# Patient Record
Sex: FEMALE | Race: WHITE | Employment: FULL TIME | URBAN - METROPOLITAN AREA
[De-identification: names, ages, dates, MRNs, and addresses within clinical notes are randomized per-mention and may not be internally consistent; named-entity substitution may affect disease eponyms.]

---

## 2019-01-31 PROBLEM — Z34.01 PRIMIGRAVIDA IN FIRST TRIMESTER: Status: ACTIVE | Noted: 2019-01-31

## 2019-03-21 PROBLEM — Z34.00 PRIMIGRAVIDA, ANTEPARTUM: Status: ACTIVE | Noted: 2019-01-31

## 2019-03-21 PROBLEM — Z34.01 PRIMIGRAVIDA IN FIRST TRIMESTER: Status: RESOLVED | Noted: 2019-01-31 | Resolved: 2019-03-21

## 2019-03-26 PROBLEM — Z36.3 ANTENATAL SCREENING FOR MALFORMATION USING ULTRASONICS: Status: ACTIVE | Noted: 2019-03-26

## 2019-03-26 PROBLEM — O32.2XX0 TRANSVERSE LIE OF FETUS: Status: ACTIVE | Noted: 2019-03-26

## 2019-04-29 ENCOUNTER — HOSPITAL ENCOUNTER (OUTPATIENT)
Age: 24
Discharge: HOME | End: 2019-04-29
Attending: OBSTETRICS & GYNECOLOGY | Admitting: OBSTETRICS & GYNECOLOGY
Payer: COMMERCIAL

## 2019-04-29 VITALS
OXYGEN SATURATION: 99 % | DIASTOLIC BLOOD PRESSURE: 58 MMHG | SYSTOLIC BLOOD PRESSURE: 117 MMHG | RESPIRATION RATE: 18 BRPM | BODY MASS INDEX: 32.02 KG/M2 | TEMPERATURE: 98.3 F | HEART RATE: 85 BPM | HEIGHT: 62 IN | WEIGHT: 174 LBS

## 2019-04-29 PROBLEM — O36.8130 DECREASED FETAL MOVEMENT AFFECTING MANAGEMENT OF PREGNANCY IN THIRD TRIMESTER: Status: ACTIVE | Noted: 2019-04-29

## 2019-04-29 PROCEDURE — 75810000275 HC EMERGENCY DEPT VISIT NO LEVEL OF CARE: Performed by: EMERGENCY MEDICINE

## 2019-04-29 NOTE — DISCHARGE INSTRUCTIONS
Patient Education   Patient Education        Learning About When to Call Your Doctor During Pregnancy (After 20 Weeks)  Your Care Instructions  It's common to have concerns about what might be a problem during pregnancy. Although most pregnant women don't have any serious problems, it's important to know when to call your doctor if you have certain symptoms or signs of labor. These are general suggestions. Your doctor may give you some more information about when to call. When to call your doctor (after 20 weeks)  Call 911 anytime you think you may need emergency care. For example, call if:  · You have severe vaginal bleeding. · You have sudden, severe pain in your belly. · You passed out (lost consciousness). · You have a seizure. · You see or feel the umbilical cord. · You think you are about to deliver your baby and can't make it safely to the hospital.  Call your doctor now or seek immediate medical care if:  · You have vaginal bleeding. · You have belly pain. · You have a fever. · You have symptoms of preeclampsia, such as:  ? Sudden swelling of your face, hands, or feet. ? New vision problems (such as dimness or blurring). ? A severe headache. · You have a sudden release of fluid from your vagina. (You think your water broke.)  · You think that you may be in labor. This means that you've had at least 4 contractions within 20 minutes or at least 8 contractions in an hour. · You notice that your baby has stopped moving or is moving much less than normal.  · You have symptoms of a urinary tract infection. These may include:  ? Pain or burning when you urinate. ? A frequent need to urinate without being able to pass much urine. ? Pain in the flank, which is just below the rib cage and above the waist on either side of the back. ? Blood in your urine. Watch closely for changes in your health, and be sure to contact your doctor if:  · You have vaginal discharge that smells bad.   · You have skin changes, such as:  ? A rash. ? Itching. ? Yellow color to your skin. · You have other concerns about your pregnancy. If you have labor signs at 37 weeks or more  If you have signs of labor at 37 weeks or more, your doctor may tell you to call when your labor becomes more active. Symptoms of active labor include:  · Contractions that are regular. · Contractions that are less than 5 minutes apart. · Contractions that are hard to talk through. Follow-up care is a key part of your treatment and safety. Be sure to make and go to all appointments, and call your doctor if you are having problems. It's also a good idea to know your test results and keep a list of the medicines you take. Where can you learn more? Go to http://dennis-alison.info/. Enter  in the search box to learn more about \"Learning About When to Call Your Doctor During Pregnancy (After 20 Weeks). \"  Current as of: September 5, 2018  Content Version: 11.9  © 9029-1605 [employer]. Care instructions adapted under license by [employer] (which disclaims liability or warranty for this information). If you have questions about a medical condition or this instruction, always ask your healthcare professional. [place] [address] any warranty or liability for your use of this information. Counting Your Baby's Kicks: Care Instructions  Your Care Instructions    Counting your baby's kicks is one way your doctor can tell that your baby is healthy. Most women--especially in a first pregnancy--feel their baby move for the first time between 16 and 22 weeks. The movement may feel like flutters rather than kicks. Your baby may move more at certain times of the day. When you are active, you may notice less kicking than when you are resting. At your prenatal visits, your doctor will ask whether the baby is active.   In your last trimester, your doctor may ask you to count the number of times you feel your baby move. Follow-up care is a key part of your treatment and safety. Be sure to make and go to all appointments, and call your doctor if you are having problems. It's also a good idea to know your test results and keep a list of the medicines you take. How do you count fetal kicks? · A common method of checking your baby's movement is to count the number of kicks or moves you feel in 1 hour. Ten movements (such as kicks, flutters, or rolls) in 1 hour are normal. Some doctors suggest that you count in the morning until you get to 10 movements. Then you can quit for that day and start again the next day. · Pick your baby's most active time of day to count. This may be any time from morning to evening. · If you do not feel 10 movements in an hour, your baby may be sleeping. Wait for the next hour and count again. When should you call for help? Call your doctor now or seek immediate medical care if:    · You noticed that your baby has stopped moving or is moving much less than normal.    Watch closely for changes in your health, and be sure to contact your doctor if you have any problems. Where can you learn more? Go to http://dennis-alison.info/. Enter T229 in the search box to learn more about \"Counting Your Baby's Kicks: Care Instructions. \"  Current as of: September 5, 2018  Content Version: 11.9  © 8029-1892 [employer]. Care instructions adapted under license by [employer] (which disclaims liability or warranty for this information). If you have questions about a medical condition or this instruction, always ask your healthcare professional. [address] any warranty or liability for your use of this information.

## 2019-04-29 NOTE — ED PROVIDER NOTES
Chief Complaint:      26 y.o. female at 24w3d  weeks gestation who is seen for decreased fetal movement. Has not felt movement for 2 days, previously felt regular movement. Pregnancy uncomplicated thus far. HISTORY:    Social History     Substance and Sexual Activity   Sexual Activity Not on file     Patient's last menstrual period was 11/06/2018 (approximate). Social History     Socioeconomic History    Marital status:      Spouse name: Not on file    Number of children: Not on file    Years of education: Not on file    Highest education level: Not on file   Occupational History    Not on file   Social Needs    Financial resource strain: Not on file    Food insecurity:     Worry: Not on file     Inability: Not on file    Transportation needs:     Medical: Not on file     Non-medical: Not on file   Tobacco Use    Smoking status: Never Smoker    Smokeless tobacco: Never Used   Substance and Sexual Activity    Alcohol use: No     Frequency: Never    Drug use: No    Sexual activity: Not on file   Lifestyle    Physical activity:     Days per week: Not on file     Minutes per session: Not on file    Stress: Not on file   Relationships    Social connections:     Talks on phone: Not on file     Gets together: Not on file     Attends Orthodoxy service: Not on file     Active member of club or organization: Not on file     Attends meetings of clubs or organizations: Not on file     Relationship status: Not on file    Intimate partner violence:     Fear of current or ex partner: Not on file     Emotionally abused: Not on file     Physically abused: Not on file     Forced sexual activity: Not on file   Other Topics Concern    Not on file   Social History Narrative    Not on file       No past surgical history on file.     Past Medical History:   Diagnosis Date    Primigravida in first trimester 1/31/2019         ROS:  A 12 point review of symptoms negative except for chief complaint as described above. PHYSICAL EXAM:  Blood pressure (!) 165/92, pulse (!) 108, temperature 98.3 °F (36.8 °C), resp. rate 18, height 5' 2\" (1.575 m), weight 78.9 kg (174 lb), last menstrual period 11/06/2018, SpO2 99 %. Constitutional: The patient appears well, alert, oriented x 3. Cardiovascular: Heart RRR, no murmurs. Respiratory: Lungs clear, no respiratory distress  GI: Abdomen soft, nontender, no guarding  No fundal tenderness  Musculoskeletal: no cva tenderness  Upper ext: no edema, reflexes +2  Lower ext: no edema, neg bree's, reflexes +2  Skin: no rashes or lesions  Psychiatric:Mood/ Affect: appropriate  Genitourinary: SVE: deferred  FHT:140-150, moderate variability, reactive NST for 24 wks  TOCO: no contractions    I personally reviewed pt's medical record including relevant labs and ultrasounds and fetal monitor strip. Assessment/Plan: Decreased fetal movement with reassuring fetal status, reactive NST. Discussed movement counts, stable for discharge home, follow up in office as scheduled.

## 2019-06-20 PROBLEM — Z34.03 PRIMIGRAVIDA IN THIRD TRIMESTER: Status: ACTIVE | Noted: 2019-01-31

## 2019-06-20 PROBLEM — R79.89 ABNORMAL SERUM THYROID STIMULATING HORMONE (TSH) LEVEL: Status: ACTIVE | Noted: 2019-06-20

## 2019-07-18 PROBLEM — O26.843 UTERINE SIZE DATE DISCREPANCY PREGNANCY, THIRD TRIMESTER: Status: ACTIVE | Noted: 2019-07-18

## 2019-07-28 PROBLEM — O36.63X0 FETAL MACROSOMIA DURING PREGNANCY IN THIRD TRIMESTER: Status: ACTIVE | Noted: 2019-07-28

## 2019-08-07 ENCOUNTER — HOSPITAL ENCOUNTER (INPATIENT)
Age: 24
LOS: 4 days | Discharge: HOME | End: 2019-08-11
Attending: OBSTETRICS & GYNECOLOGY | Admitting: OBSTETRICS & GYNECOLOGY
Payer: COMMERCIAL

## 2019-08-07 DIAGNOSIS — Z34.90 ENCOUNTER FOR PLANNED INDUCTION OF LABOR: Primary | ICD-10-CM

## 2019-08-07 PROBLEM — Z3A.38 38 WEEKS GESTATION OF PREGNANCY: Status: ACTIVE | Noted: 2019-08-07

## 2019-08-07 PROBLEM — O14.93 PRE-ECLAMPSIA IN THIRD TRIMESTER: Status: ACTIVE | Noted: 2019-08-07

## 2019-08-07 PROBLEM — O14.93 PREECLAMPSIA, THIRD TRIMESTER: Status: ACTIVE | Noted: 2019-08-07

## 2019-08-07 LAB
ABO + RH BLD: NORMAL
BLOOD GROUP ANTIBODIES SERPL: NORMAL
ERYTHROCYTE [DISTWIDTH] IN BLOOD BY AUTOMATED COUNT: 12.5 % (ref 11.9–14.6)
HCT VFR BLD AUTO: 34.4 % (ref 35.8–46.3)
HGB BLD-MCNC: 11.8 G/DL (ref 11.7–15.4)
MCH RBC QN AUTO: 29.6 PG (ref 26.1–32.9)
MCHC RBC AUTO-ENTMCNC: 34.3 G/DL (ref 31.4–35)
MCV RBC AUTO: 86.4 FL (ref 79.6–97.8)
NRBC # BLD: 0 K/UL (ref 0–0.2)
PLATELET # BLD AUTO: 160 K/UL (ref 150–450)
PMV BLD AUTO: 12.2 FL (ref 9.4–12.3)
RBC # BLD AUTO: 3.98 M/UL (ref 4.05–5.2)
SPECIMEN EXP DATE BLD: NORMAL
WBC # BLD AUTO: 11.4 K/UL (ref 4.3–11.1)

## 2019-08-07 PROCEDURE — 74011250636 HC RX REV CODE- 250/636: Performed by: OBSTETRICS & GYNECOLOGY

## 2019-08-07 PROCEDURE — 86900 BLOOD TYPING SEROLOGIC ABO: CPT

## 2019-08-07 PROCEDURE — 65270000029 HC RM PRIVATE

## 2019-08-07 PROCEDURE — 4A1H74Z MONITORING OF PRODUCTS OF CONCEPTION, CARDIAC ELECTRICAL ACTIVITY, VIA NATURAL OR ARTIFICIAL OPENING: ICD-10-PCS | Performed by: OBSTETRICS & GYNECOLOGY

## 2019-08-07 PROCEDURE — 85027 COMPLETE CBC AUTOMATED: CPT

## 2019-08-07 PROCEDURE — 74011250637 HC RX REV CODE- 250/637: Performed by: OBSTETRICS & GYNECOLOGY

## 2019-08-07 RX ORDER — BUTORPHANOL TARTRATE 2 MG/ML
1 INJECTION INTRAMUSCULAR; INTRAVENOUS
Status: DISCONTINUED | OUTPATIENT
Start: 2019-08-07 | End: 2019-08-09 | Stop reason: HOSPADM

## 2019-08-07 RX ORDER — OXYTOCIN/RINGER'S LACTATE 30/500 ML
0-25 PLASTIC BAG, INJECTION (ML) INTRAVENOUS
Status: DISCONTINUED | OUTPATIENT
Start: 2019-08-07 | End: 2019-08-09 | Stop reason: HOSPADM

## 2019-08-07 RX ORDER — DEXTROSE, SODIUM CHLORIDE, SODIUM LACTATE, POTASSIUM CHLORIDE, AND CALCIUM CHLORIDE 5; .6; .31; .03; .02 G/100ML; G/100ML; G/100ML; G/100ML; G/100ML
125 INJECTION, SOLUTION INTRAVENOUS CONTINUOUS
Status: DISCONTINUED | OUTPATIENT
Start: 2019-08-07 | End: 2019-08-11 | Stop reason: HOSPADM

## 2019-08-07 RX ORDER — SODIUM CHLORIDE 0.9 % (FLUSH) 0.9 %
5-40 SYRINGE (ML) INJECTION EVERY 8 HOURS
Status: DISCONTINUED | OUTPATIENT
Start: 2019-08-07 | End: 2019-08-11 | Stop reason: HOSPADM

## 2019-08-07 RX ORDER — MINERAL OIL
120 OIL (ML) ORAL
Status: ACTIVE | OUTPATIENT
Start: 2019-08-07 | End: 2019-08-08

## 2019-08-07 RX ORDER — OXYTOCIN/RINGER'S LACTATE 15/250 ML
250 PLASTIC BAG, INJECTION (ML) INTRAVENOUS ONCE
Status: ACTIVE | OUTPATIENT
Start: 2019-08-07 | End: 2019-08-08

## 2019-08-07 RX ORDER — SODIUM CHLORIDE 0.9 % (FLUSH) 0.9 %
5-40 SYRINGE (ML) INJECTION AS NEEDED
Status: DISCONTINUED | OUTPATIENT
Start: 2019-08-07 | End: 2019-08-11 | Stop reason: HOSPADM

## 2019-08-07 RX ORDER — LIDOCAINE HYDROCHLORIDE 20 MG/ML
JELLY TOPICAL
Status: ACTIVE | OUTPATIENT
Start: 2019-08-07 | End: 2019-08-08

## 2019-08-07 RX ORDER — LIDOCAINE HYDROCHLORIDE 10 MG/ML
1 INJECTION INFILTRATION; PERINEURAL
Status: ACTIVE | OUTPATIENT
Start: 2019-08-07 | End: 2019-08-08

## 2019-08-07 RX ADMIN — MISOPROSTOL 50 MCG: 100 TABLET ORAL at 19:30

## 2019-08-07 RX ADMIN — SODIUM CHLORIDE, SODIUM LACTATE, POTASSIUM CHLORIDE, AND CALCIUM CHLORIDE 500 ML: 600; 310; 30; 20 INJECTION, SOLUTION INTRAVENOUS at 18:54

## 2019-08-07 NOTE — PROGRESS NOTES
Pt sitting up in bed watching tv. States she is nervous. POC reviewed with pt and family at bedside. Opportunity for questions provided. Pt denies pain. SVE as charted on flowsheet. Instructed to call out for assistance as needed.

## 2019-08-07 NOTE — PROGRESS NOTES
Dr. Madison Hernandez called and report given that pt has come in to be started for her induction of labor. Placed on EFM and the baby is tachycardic with baseline of 170's with moderate variability and accelerations. Pt has an IV of LR that has a bolus going that bringing the heart rate down to the 160's. Asked if patient could proceed with the cytotec at this time. Pt may have 50 mcg of cytotec buccal every 6 hrs.

## 2019-08-08 PROBLEM — O32.2XX0 TRANSVERSE LIE OF FETUS: Status: RESOLVED | Noted: 2019-03-26 | Resolved: 2019-08-08

## 2019-08-08 PROCEDURE — 74011250637 HC RX REV CODE- 250/637: Performed by: OBSTETRICS & GYNECOLOGY

## 2019-08-08 PROCEDURE — 65270000029 HC RM PRIVATE

## 2019-08-08 PROCEDURE — 74011250636 HC RX REV CODE- 250/636: Performed by: OBSTETRICS & GYNECOLOGY

## 2019-08-08 PROCEDURE — 3E0P7VZ INTRODUCTION OF HORMONE INTO FEMALE REPRODUCTIVE, VIA NATURAL OR ARTIFICIAL OPENING: ICD-10-PCS | Performed by: OBSTETRICS & GYNECOLOGY

## 2019-08-08 RX ORDER — OXYTOCIN/RINGER'S LACTATE 30/500 ML
0-20 PLASTIC BAG, INJECTION (ML) INTRAVENOUS
Status: DISCONTINUED | OUTPATIENT
Start: 2019-08-08 | End: 2019-08-11 | Stop reason: HOSPADM

## 2019-08-08 RX ADMIN — SODIUM CHLORIDE, SODIUM LACTATE, POTASSIUM CHLORIDE, CALCIUM CHLORIDE, AND DEXTROSE MONOHYDRATE 125 ML/HR: 600; 310; 30; 20; 5 INJECTION, SOLUTION INTRAVENOUS at 17:36

## 2019-08-08 RX ADMIN — BUTORPHANOL TARTRATE 1 MG: 2 INJECTION, SOLUTION INTRAMUSCULAR; INTRAVENOUS at 23:24

## 2019-08-08 RX ADMIN — OXYTOCIN 2 MILLI-UNITS/MIN: 10 INJECTION, SOLUTION INTRAMUSCULAR; INTRAVENOUS at 16:47

## 2019-08-08 RX ADMIN — MISOPROSTOL 50 MCG: 100 TABLET ORAL at 10:20

## 2019-08-08 RX ADMIN — SODIUM CHLORIDE, SODIUM LACTATE, POTASSIUM CHLORIDE, CALCIUM CHLORIDE, AND DEXTROSE MONOHYDRATE 125 ML/HR: 600; 310; 30; 20; 5 INJECTION, SOLUTION INTRAVENOUS at 16:46

## 2019-08-08 RX ADMIN — OXYTOCIN 2 MILLI-UNITS/MIN: 10 INJECTION, SOLUTION INTRAMUSCULAR; INTRAVENOUS at 17:35

## 2019-08-08 RX ADMIN — MISOPROSTOL 50 MCG: 100 TABLET ORAL at 01:32

## 2019-08-08 NOTE — PROGRESS NOTES
Dr Madison Hernandez called  And updated pt is kahlil 4.5-3 min, FHT's reactive and pt states pain is a 6 but tolerable. No new orders at this time.

## 2019-08-08 NOTE — PROGRESS NOTES
Subjective: Pt tolerating labor well. S/p 2 dose of cytotec with minimal response from cervix. Mild - mod ctx q 2-4 mins. Good FM. No HAs or visual changes.      Objective:    Vitals:    19 2031 19 2127 19 0131 19 0752   BP: 133/77 126/80 132/78 140/76   Pulse: 78 83 86 76   Resp: 18 18 16 18   Temp: 98.3 °F (36.8 °C) 98.7 °F (37.1 °C) 98.1 °F (36.7 °C) 98 °F (36.7 °C)       FHT's:  Baseline , good accels  Moyers:  3mins    SVE:  1/50/-3  Membranes:  intact    Assessement and Plan: Nieves Waite 26 y.o.  at 38w6d admitted for gest HTN w/ susp pre-eclampsia    Repeat cytotec thi sudha after breakfast and shower    Pain control: good    GBS: neg      Valentín Leong DO    8:36 AM    19

## 2019-08-08 NOTE — PROGRESS NOTES
When rounding on pt, she was very tearful during conversation. When asked what she was feeling, she expressed concerns with complications of the delivery and \"everything that can go wrong\". While crying, she stated that she was part of a \"mommy blog\" and she read a post about another mom who was induced around her same GA and the baby . I tried to reassure her however she admitted that the thoughts were becoming somewhat overwhelming to her. I encouraged her to talk to her MD about these thoughts and to take extra caution after the baby is born in regards to postpartum depression.

## 2019-08-08 NOTE — PROGRESS NOTES
01:32 Medication Given miSOPROStol (CYTOTEC) tablet (prepacked) 50 mcg -  Dose: 50 mcg ; Route: Buccal ; Scheduled Time: 0200  Lindy See

## 2019-08-08 NOTE — H&P
Krys Hernández OB H&P      Assessment/Plan:    Gest HTn with suspected early pre-eclampsia - admit for cytotec induction  Fetal macrosomia: AC in 96%. Over growth + 10 days.   Watch for arrest of labor         Problem List  Date Reviewed: 2019          Codes Class    Primigravida in third trimester ICD-10-CM: Z34.03  ICD-9-CM: V22.0     Overview Signed 2019 10:54 AM by Keisha Six, DO     EDC confirmed by first trimester ultrasound  NIPTnegative/girl  Initial TSH mildly suppressed with normal T4  Midtrimester anatomy ultrasound with normal anatomy, anterior placenta with no previa  1 hour GTT normal             Uterine size date discrepancy pregnancy, third trimester ICD-10-CM: O26.843  ICD-9-CM: 649.63     Overview Signed 2019 10:41 AM by Keisha Six, DO     Measuring 4 weeks ahead             Pre-eclampsia in third trimester ICD-10-CM: O14.93  ICD-9-CM: 642.43         38 weeks gestation of pregnancy ICD-10-CM: Z3A.38  ICD-9-CM: V22.2         Preeclampsia, third trimester ICD-10-CM: O14.93  ICD-9-CM: 642.43         Encounter for planned induction of labor ICD-10-CM: Z34.90  ICD-9-CM: V22.1         Fetal macrosomia during pregnancy in third trimester ICD-10-CM: O36.63X0  ICD-9-CM: 656.63     Overview Signed 2019  7:02 PM by Keisha Six, DO     Overall growth pattern measuring 10 days ahead with BPD and AC in the 95th and 93rd percentiles respectively estimated fetal weight 3348 g consistent with an overall growth pattern in the 89th percentile             Abnormal serum thyroid stimulating hormone (TSH) level ICD-10-CM: R79.89  ICD-9-CM: 790.6     Overview Signed 2019 10:51 AM by Keisha Six, DO     Retest TSH with free T4             Decreased fetal movement affecting management of pregnancy in third trimester ICD-10-CM: O36.8130  ICD-9-CM: 655.73               Subjective    Rosemary Davenport 26 y.o.  38w6d  presented to L&D for cytotec induction for developing pre-eclampsia. 4# weight gain with 3 BP with diastolics in the 08-50 range. Mild hyperreflexia. Has yesterday. Pt has  complaints of swelling . Pt denies vaginal bleeding/discharge. No LOF.  +FM. OB History    Para Term  AB Living   1             SAB TAB Ectopic Molar Multiple Live Births                    # Outcome Date GA Lbr Ron/2nd Weight Sex Delivery Anes PTL Lv   1 Current                Past Medical History:   Diagnosis Date    Gestational hypertension     Pre-eclampsia in third trimester 2019       No past surgical history on file.     Family History   Problem Relation Age of Onset    Depression Mother     Seizures Father        Social History     Socioeconomic History    Marital status:      Spouse name: Not on file    Number of children: Not on file    Years of education: Not on file    Highest education level: Not on file   Occupational History    Not on file   Social Needs    Financial resource strain: Not on file    Food insecurity:     Worry: Not on file     Inability: Not on file    Transportation needs:     Medical: Not on file     Non-medical: Not on file   Tobacco Use    Smoking status: Never Smoker    Smokeless tobacco: Never Used   Substance and Sexual Activity    Alcohol use: No     Frequency: Never    Drug use: No    Sexual activity: Yes     Partners: Male     Birth control/protection: None   Lifestyle    Physical activity:     Days per week: Not on file     Minutes per session: Not on file    Stress: Not on file   Relationships    Social connections:     Talks on phone: Not on file     Gets together: Not on file     Attends Caodaism service: Not on file     Active member of club or organization: Not on file     Attends meetings of clubs or organizations: Not on file     Relationship status: Not on file    Intimate partner violence:     Fear of current or ex partner: Not on file     Emotionally abused: Not on file     Physically abused: Not on file     Forced sexual activity: Not on file   Other Topics Concern     Service Not Asked    Blood Transfusions Not Asked    Caffeine Concern Not Asked    Occupational Exposure Not Asked    Hobby Hazards Not Asked    Sleep Concern Not Asked    Stress Concern Not Asked    Weight Concern Not Asked    Special Diet Not Asked    Back Care Not Asked    Exercise Not Asked    Bike Helmet Not Asked   [address] Not Asked    Self-Exams Not Asked   Social History Narrative    Not on file       Allergies   Allergen Reactions    Amoxicillin Nausea and Vomiting         Review of Systems:    Constitutional: No fevers or chills     Prenatal: + fetal movement, no VB/DC, no LOF     CV: No chest pain or palpatations    Resp: No SOB or cough    GI: No nausea/vomiting/diarrhea/constipation    Neuro:  no seizure like activity    Skin: No rashes or lesions     Breast: No breast pain    : No dysuria or hematuria      Prenatal Record Review    The prenatal record has been reviewed. Prenatal Labs:   Lab Results   Component Value Date/Time    Rubella, External Immune 01/31/2019    Rubella, External 1.31  Immune 01/31/2019    HBsAg, External Negative 01/31/2019    HIV, External N.r. 01/31/2019    RPR, External N.R. 01/31/2019       Objective    Visit Vitals  /76   Pulse 76   Temp 98 °F (36.7 °C)   Resp 18   LMP 11/06/2018 (Approximate)         Obstetric Exam    SVE: 1/50/-3/posterior    NST: Baseline 160, no decels      Physical Exam    Gen: alert and cooperative, NAD    HEENT: NCAT    CV: RRR    RESP: CTA bilat    ABD: Gravid, soft, NT    EXT: trace edema bilat    NEURO: No focal deficits.   DTR's -3+ bilat    SKIN: No noted rashes or lesions       Alvenia Severance, DO  8:23 AM  08/08/19

## 2019-08-09 ENCOUNTER — ANESTHESIA EVENT (OUTPATIENT)
Dept: LABOR AND DELIVERY | Age: 24
End: 2019-08-09
Payer: COMMERCIAL

## 2019-08-09 ENCOUNTER — ANESTHESIA (OUTPATIENT)
Dept: LABOR AND DELIVERY | Age: 24
End: 2019-08-09
Payer: COMMERCIAL

## 2019-08-09 LAB
ARTERIAL PATENCY WRIST A: ABNORMAL
ARTERIAL PATENCY WRIST A: ABNORMAL
BASE DEFICIT BLD-SCNC: 5 MMOL/L
BASE DEFICIT BLD-SCNC: 7 MMOL/L
BDY SITE: ABNORMAL
BDY SITE: ABNORMAL
BODY TEMPERATURE: 98.6
BODY TEMPERATURE: 98.6
CO2 BLD-SCNC: 19 MMOL/L
CO2 BLD-SCNC: 23 MMOL/L
COLLECT TIME,HTIME: 1657
COLLECT TIME,HTIME: 1657
GAS FLOW.O2 O2 DELIVERY SYS: ABNORMAL L/MIN
GAS FLOW.O2 O2 DELIVERY SYS: ABNORMAL L/MIN
HCO3 BLD-SCNC: 21.9 MMOL/L (ref 22–26)
HCO3 BLDV-SCNC: 17.8 MMOL/L (ref 23–28)
PCO2 BLDCO: 35 MMHG (ref 32–68)
PCO2 BLDCO: 47 MMHG (ref 32–68)
PH BLDCO: 7.28 [PH] (ref 7.15–7.38)
PH BLDCO: 7.32 [PH] (ref 7.15–7.38)
PO2 BLDCO: 16 MMHG
PO2 BLDCO: 25 MMHG
SAO2 % BLD: 18 % (ref 95–98)
SAO2 % BLDV: 42 % (ref 65–88)
SERVICE CMNT-IMP: ABNORMAL
SERVICE CMNT-IMP: ABNORMAL
SPECIMEN TYPE: ABNORMAL
SPECIMEN TYPE: ABNORMAL

## 2019-08-09 PROCEDURE — 75410000002 HC LABOR FEE PER 1 HR

## 2019-08-09 PROCEDURE — 77030031139 HC SUT VCRL2 J&J -A: Performed by: OBSTETRICS & GYNECOLOGY

## 2019-08-09 PROCEDURE — 76060000078 HC EPIDURAL ANESTHESIA: Performed by: OBSTETRICS & GYNECOLOGY

## 2019-08-09 PROCEDURE — 65270000029 HC RM PRIVATE

## 2019-08-09 PROCEDURE — 77030039266 HC ADH SKN EXOFIN S2SG -A: Performed by: OBSTETRICS & GYNECOLOGY

## 2019-08-09 PROCEDURE — 74011250637 HC RX REV CODE- 250/637: Performed by: ANESTHESIOLOGY

## 2019-08-09 PROCEDURE — A4300 CATH IMPL VASC ACCESS PORTAL: HCPCS | Performed by: ANESTHESIOLOGY

## 2019-08-09 PROCEDURE — 77030002933 HC SUT MCRYL J&J -A: Performed by: OBSTETRICS & GYNECOLOGY

## 2019-08-09 PROCEDURE — 74011250636 HC RX REV CODE- 250/636

## 2019-08-09 PROCEDURE — 74011250636 HC RX REV CODE- 250/636: Performed by: OBSTETRICS & GYNECOLOGY

## 2019-08-09 PROCEDURE — 77030002966 HC SUT PDS J&J -A: Performed by: OBSTETRICS & GYNECOLOGY

## 2019-08-09 PROCEDURE — 77030034696 HC CATH URETH FOL 2W BARD -A: Performed by: OBSTETRICS & GYNECOLOGY

## 2019-08-09 PROCEDURE — 75410000003 HC RECOV DEL/VAG/CSECN EA 0.5 HR: Performed by: OBSTETRICS & GYNECOLOGY

## 2019-08-09 PROCEDURE — 76010000392 HC C SECN EA ADDL 0.5 HR: Performed by: OBSTETRICS & GYNECOLOGY

## 2019-08-09 PROCEDURE — 77030005537 HC CATH URETH BARD -A

## 2019-08-09 PROCEDURE — 77030034696 HC CATH URETH FOL 2W BARD -A

## 2019-08-09 PROCEDURE — 76060000078 HC EPIDURAL ANESTHESIA

## 2019-08-09 PROCEDURE — 77030032490 HC SLV COMPR SCD KNE COVD -B

## 2019-08-09 PROCEDURE — 77030018836 HC SOL IRR NACL ICUM -A: Performed by: OBSTETRICS & GYNECOLOGY

## 2019-08-09 PROCEDURE — 77030018846 HC SOL IRR STRL H20 ICUM -A: Performed by: OBSTETRICS & GYNECOLOGY

## 2019-08-09 PROCEDURE — 74011000250 HC RX REV CODE- 250

## 2019-08-09 PROCEDURE — 77030014125 HC TY EPDRL BBMI -B: Performed by: ANESTHESIOLOGY

## 2019-08-09 PROCEDURE — 76010000391 HC C SECN FIRST 1 HR: Performed by: OBSTETRICS & GYNECOLOGY

## 2019-08-09 PROCEDURE — 77030011943

## 2019-08-09 PROCEDURE — 82803 BLOOD GASES ANY COMBINATION: CPT

## 2019-08-09 PROCEDURE — 74011250636 HC RX REV CODE- 250/636: Performed by: ANESTHESIOLOGY

## 2019-08-09 PROCEDURE — 77030032490 HC SLV COMPR SCD KNE COVD -B: Performed by: OBSTETRICS & GYNECOLOGY

## 2019-08-09 PROCEDURE — 77030008459 HC STPLR SKN COOP -B: Performed by: OBSTETRICS & GYNECOLOGY

## 2019-08-09 RX ORDER — FENTANYL CITRATE 50 UG/ML
INJECTION, SOLUTION INTRAMUSCULAR; INTRAVENOUS AS NEEDED
Status: DISCONTINUED | OUTPATIENT
Start: 2019-08-09 | End: 2019-08-09 | Stop reason: HOSPADM

## 2019-08-09 RX ORDER — ACETAMINOPHEN 10 MG/ML
1000 INJECTION, SOLUTION INTRAVENOUS ONCE
Status: COMPLETED | OUTPATIENT
Start: 2019-08-09 | End: 2019-08-09

## 2019-08-09 RX ORDER — OXYTOCIN/RINGER'S LACTATE 30/500 ML
PLASTIC BAG, INJECTION (ML) INTRAVENOUS
Status: DISCONTINUED | OUTPATIENT
Start: 2019-08-09 | End: 2019-08-09 | Stop reason: HOSPADM

## 2019-08-09 RX ORDER — LIDOCAINE HYDROCHLORIDE AND EPINEPHRINE 20; 5 MG/ML; UG/ML
INJECTION, SOLUTION EPIDURAL; INFILTRATION; INTRACAUDAL; PERINEURAL AS NEEDED
Status: DISCONTINUED | OUTPATIENT
Start: 2019-08-09 | End: 2019-08-09 | Stop reason: HOSPADM

## 2019-08-09 RX ORDER — MINERAL OIL
120 OIL (ML) ORAL AS NEEDED
Status: DISCONTINUED | OUTPATIENT
Start: 2019-08-09 | End: 2019-08-11 | Stop reason: HOSPADM

## 2019-08-09 RX ORDER — MORPHINE SULFATE 0.5 MG/ML
INJECTION, SOLUTION EPIDURAL; INTRATHECAL; INTRAVENOUS AS NEEDED
Status: DISCONTINUED | OUTPATIENT
Start: 2019-08-09 | End: 2019-08-09 | Stop reason: HOSPADM

## 2019-08-09 RX ORDER — ACETAMINOPHEN 500 MG
1000 TABLET ORAL EVERY 8 HOURS
Status: DISCONTINUED | OUTPATIENT
Start: 2019-08-09 | End: 2019-08-09

## 2019-08-09 RX ORDER — ROPIVACAINE HYDROCHLORIDE 2 MG/ML
INJECTION, SOLUTION EPIDURAL; INFILTRATION; PERINEURAL AS NEEDED
Status: DISCONTINUED | OUTPATIENT
Start: 2019-08-09 | End: 2019-08-09 | Stop reason: HOSPADM

## 2019-08-09 RX ORDER — NALBUPHINE HYDROCHLORIDE 10 MG/ML
5 INJECTION, SOLUTION INTRAMUSCULAR; INTRAVENOUS; SUBCUTANEOUS
Status: DISCONTINUED | OUTPATIENT
Start: 2019-08-09 | End: 2019-08-10

## 2019-08-09 RX ORDER — TRISODIUM CITRATE DIHYDRATE AND CITRIC ACID MONOHYDRATE 500; 334 MG/5ML; MG/5ML
30 SOLUTION ORAL ONCE
Status: COMPLETED | OUTPATIENT
Start: 2019-08-09 | End: 2019-08-09

## 2019-08-09 RX ORDER — FAMOTIDINE 20 MG/1
20 TABLET, FILM COATED ORAL ONCE
Status: COMPLETED | OUTPATIENT
Start: 2019-08-09 | End: 2019-08-09

## 2019-08-09 RX ORDER — ROPIVACAINE HYDROCHLORIDE 2 MG/ML
INJECTION, SOLUTION EPIDURAL; INFILTRATION; PERINEURAL
Status: DISCONTINUED | OUTPATIENT
Start: 2019-08-09 | End: 2019-08-09 | Stop reason: HOSPADM

## 2019-08-09 RX ORDER — METOCLOPRAMIDE 10 MG/1
10 TABLET ORAL ONCE
Status: COMPLETED | OUTPATIENT
Start: 2019-08-09 | End: 2019-08-09

## 2019-08-09 RX ORDER — CEFAZOLIN SODIUM/WATER 2 G/20 ML
2 SYRINGE (ML) INTRAVENOUS ONCE
Status: COMPLETED | OUTPATIENT
Start: 2019-08-09 | End: 2019-08-09

## 2019-08-09 RX ORDER — SODIUM CHLORIDE, SODIUM LACTATE, POTASSIUM CHLORIDE, CALCIUM CHLORIDE 600; 310; 30; 20 MG/100ML; MG/100ML; MG/100ML; MG/100ML
INJECTION, SOLUTION INTRAVENOUS
Status: DISCONTINUED | OUTPATIENT
Start: 2019-08-09 | End: 2019-08-09 | Stop reason: HOSPADM

## 2019-08-09 RX ORDER — SODIUM BICARBONATE 1 MEQ/ML
SYRINGE (ML) INTRAVENOUS AS NEEDED
Status: DISCONTINUED | OUTPATIENT
Start: 2019-08-09 | End: 2019-08-09 | Stop reason: HOSPADM

## 2019-08-09 RX ORDER — CHLOROPROCAINE HYDROCHLORIDE 20 MG/ML
INJECTION, SOLUTION EPIDURAL; INFILTRATION; INTRACAUDAL; PERINEURAL AS NEEDED
Status: DISCONTINUED | OUTPATIENT
Start: 2019-08-09 | End: 2019-08-09 | Stop reason: HOSPADM

## 2019-08-09 RX ORDER — SODIUM CHLORIDE, SODIUM LACTATE, POTASSIUM CHLORIDE, CALCIUM CHLORIDE 600; 310; 30; 20 MG/100ML; MG/100ML; MG/100ML; MG/100ML
125 INJECTION, SOLUTION INTRAVENOUS CONTINUOUS
Status: DISCONTINUED | OUTPATIENT
Start: 2019-08-09 | End: 2019-08-10

## 2019-08-09 RX ORDER — KETOROLAC TROMETHAMINE 30 MG/ML
INJECTION, SOLUTION INTRAMUSCULAR; INTRAVENOUS AS NEEDED
Status: DISCONTINUED | OUTPATIENT
Start: 2019-08-09 | End: 2019-08-09 | Stop reason: HOSPADM

## 2019-08-09 RX ORDER — OXYCODONE HYDROCHLORIDE 5 MG/1
5 TABLET ORAL
Status: DISCONTINUED | OUTPATIENT
Start: 2019-08-09 | End: 2019-08-10

## 2019-08-09 RX ORDER — EPHEDRINE SULFATE 50 MG/ML
INJECTION, SOLUTION INTRAVENOUS AS NEEDED
Status: DISCONTINUED | OUTPATIENT
Start: 2019-08-09 | End: 2019-08-09 | Stop reason: HOSPADM

## 2019-08-09 RX ORDER — DIPHENHYDRAMINE HYDROCHLORIDE 50 MG/ML
25 INJECTION, SOLUTION INTRAMUSCULAR; INTRAVENOUS ONCE
Status: COMPLETED | OUTPATIENT
Start: 2019-08-09 | End: 2019-08-09

## 2019-08-09 RX ORDER — PHENYLEPHRINE HYDROCHLORIDE 10 MG/ML
INJECTION INTRAVENOUS AS NEEDED
Status: DISCONTINUED | OUTPATIENT
Start: 2019-08-09 | End: 2019-08-09 | Stop reason: HOSPADM

## 2019-08-09 RX ORDER — ACETAMINOPHEN 10 MG/ML
1000 INJECTION, SOLUTION INTRAVENOUS EVERY 6 HOURS
Status: DISCONTINUED | OUTPATIENT
Start: 2019-08-10 | End: 2019-08-10

## 2019-08-09 RX ORDER — HYDROMORPHONE HYDROCHLORIDE 2 MG/ML
1 INJECTION, SOLUTION INTRAMUSCULAR; INTRAVENOUS; SUBCUTANEOUS
Status: DISCONTINUED | OUTPATIENT
Start: 2019-08-09 | End: 2019-08-10

## 2019-08-09 RX ORDER — ONDANSETRON 2 MG/ML
INJECTION INTRAMUSCULAR; INTRAVENOUS AS NEEDED
Status: DISCONTINUED | OUTPATIENT
Start: 2019-08-09 | End: 2019-08-09 | Stop reason: HOSPADM

## 2019-08-09 RX ORDER — FENTANYL CITRATE 50 UG/ML
INJECTION, SOLUTION INTRAMUSCULAR; INTRAVENOUS
Status: COMPLETED
Start: 2019-08-09 | End: 2019-08-09

## 2019-08-09 RX ORDER — KETOROLAC TROMETHAMINE 30 MG/ML
30 INJECTION, SOLUTION INTRAMUSCULAR; INTRAVENOUS
Status: DISCONTINUED | OUTPATIENT
Start: 2019-08-09 | End: 2019-08-10

## 2019-08-09 RX ADMIN — SODIUM CHLORIDE, SODIUM LACTATE, POTASSIUM CHLORIDE, AND CALCIUM CHLORIDE 125 ML/HR: 600; 310; 30; 20 INJECTION, SOLUTION INTRAVENOUS at 19:58

## 2019-08-09 RX ADMIN — Medication 2 MEQ: at 16:32

## 2019-08-09 RX ADMIN — PHENYLEPHRINE HYDROCHLORIDE 80 MCG: 10 INJECTION INTRAVENOUS at 16:56

## 2019-08-09 RX ADMIN — Medication 2 G: at 16:20

## 2019-08-09 RX ADMIN — AZITHROMYCIN MONOHYDRATE 500 MG: 500 INJECTION, POWDER, LYOPHILIZED, FOR SOLUTION INTRAVENOUS at 16:26

## 2019-08-09 RX ADMIN — ACETAMINOPHEN 1000 MG: 10 INJECTION, SOLUTION INTRAVENOUS at 20:18

## 2019-08-09 RX ADMIN — SODIUM CITRATE AND CITRIC ACID MONOHYDRATE 30 ML: 500; 334 SOLUTION ORAL at 16:18

## 2019-08-09 RX ADMIN — SODIUM CHLORIDE, SODIUM LACTATE, POTASSIUM CHLORIDE, CALCIUM CHLORIDE, AND DEXTROSE MONOHYDRATE 125 ML/HR: 600; 310; 30; 20; 5 INJECTION, SOLUTION INTRAVENOUS at 09:14

## 2019-08-09 RX ADMIN — FAMOTIDINE 20 MG: 20 TABLET, FILM COATED ORAL at 16:18

## 2019-08-09 RX ADMIN — LIDOCAINE HYDROCHLORIDE AND EPINEPHRINE 20 ML: 20; 5 INJECTION, SOLUTION EPIDURAL; INFILTRATION; INTRACAUDAL; PERINEURAL at 16:32

## 2019-08-09 RX ADMIN — METOCLOPRAMIDE HYDROCHLORIDE 10 MG: 10 TABLET ORAL at 16:18

## 2019-08-09 RX ADMIN — EPHEDRINE SULFATE 10 MG: 50 INJECTION, SOLUTION INTRAVENOUS at 01:48

## 2019-08-09 RX ADMIN — MORPHINE SULFATE 4 MG: 0.5 INJECTION, SOLUTION EPIDURAL; INTRATHECAL; INTRAVENOUS at 17:17

## 2019-08-09 RX ADMIN — MORPHINE SULFATE 1 MG: 0.5 INJECTION, SOLUTION EPIDURAL; INTRATHECAL; INTRAVENOUS at 16:59

## 2019-08-09 RX ADMIN — DIPHENHYDRAMINE HYDROCHLORIDE 25 MG: 50 INJECTION, SOLUTION INTRAMUSCULAR; INTRAVENOUS at 13:24

## 2019-08-09 RX ADMIN — ROPIVACAINE HYDROCHLORIDE 7 ML/HR: 2 INJECTION, SOLUTION EPIDURAL; INFILTRATION; PERINEURAL at 00:40

## 2019-08-09 RX ADMIN — KETOROLAC TROMETHAMINE 30 MG: 30 INJECTION, SOLUTION INTRAMUSCULAR; INTRAVENOUS at 17:16

## 2019-08-09 RX ADMIN — CHLOROPROCAINE HYDROCHLORIDE 10 ML: 20 INJECTION, SOLUTION EPIDURAL; INFILTRATION; INTRACAUDAL; PERINEURAL at 14:13

## 2019-08-09 RX ADMIN — ONDANSETRON 4 MG: 2 INJECTION INTRAMUSCULAR; INTRAVENOUS at 16:59

## 2019-08-09 RX ADMIN — SODIUM CHLORIDE, SODIUM LACTATE, POTASSIUM CHLORIDE, CALCIUM CHLORIDE: 600; 310; 30; 20 INJECTION, SOLUTION INTRAVENOUS at 16:32

## 2019-08-09 RX ADMIN — ROPIVACAINE HYDROCHLORIDE 8 ML: 2 INJECTION, SOLUTION EPIDURAL; INFILTRATION; PERINEURAL at 00:36

## 2019-08-09 RX ADMIN — FENTANYL CITRATE 100 MCG: 50 INJECTION, SOLUTION INTRAMUSCULAR; INTRAVENOUS at 10:10

## 2019-08-09 RX ADMIN — Medication 500 ML/HR: at 16:59

## 2019-08-09 RX ADMIN — PHENYLEPHRINE HYDROCHLORIDE 160 MCG: 10 INJECTION INTRAVENOUS at 17:24

## 2019-08-09 RX ADMIN — PHENYLEPHRINE HYDROCHLORIDE 160 MCG: 10 INJECTION INTRAVENOUS at 17:09

## 2019-08-09 RX ADMIN — ROPIVACAINE HYDROCHLORIDE 10 ML/HR: 2 INJECTION, SOLUTION EPIDURAL; INFILTRATION; PERINEURAL at 10:10

## 2019-08-09 NOTE — PROGRESS NOTES
SBAR OUT Report: Mother    Verbal report given to Gurinder Osborne RN (full name & credentials) on this patient, who is now being transferred to MIU (unit) for routine progression of care. The patient is wearing a green \"Anesthesia-Duramorph\" band. Report consisted of patient's Situation, Background, Assessment and Recommendations (SBAR). [place] ID bands were compared with the identification form, and verified with the patient and receiving nurse. Information from the SBAR and the [address] [facility] Report was reviewed with the receiving nurse; opportunity for questions and clarification provided.

## 2019-08-09 NOTE — PROGRESS NOTES
Decelerations of fhts after the emma of contractions and not returning to baseline until after the contractions is over. Turned left to right and left on right side. Fluid bolus. Pitocin decreased to 4mu.  sve 4-100

## 2019-08-09 NOTE — PROGRESS NOTES
Dr. Chato Dia called on phone and updated on patient status, SVE per flowsheet, FHTs per flowsheet, vital signs per flowsheet.

## 2019-08-09 NOTE — PROGRESS NOTES
In and out cath for 500cc of clear yellow urine. Dr. Osbaldo Ruby ordered to continue to rest pt and hold pitocin until 5 am pt repositioned on left side.

## 2019-08-09 NOTE — OP NOTES
Ramiro Hernandez  1995    Preop Dx:   1. IUP @ 38 weeks gestation   2. Arrest of descent    Postop Dx: Same    Procedure: primary LTCS    Surgeon: Reynaldo Lutz     Anesthesia: epidural    EBL: 154 mL     Complications: None    Findings:  Viable Female infant. vtx position. APGARS 8,9.  Weight:  3685 lgm Normal appearing uterus, tubes and ovaries. Procedure:  Pt was taken to the operating room where epidural anesthesia was found to be adequate. She was then prepped and draped in the usual sterile fashion and placed in the supine position with a leftward tilt. A Pfannenstiel skin incision was made with the scalpel and carried down to the underlying layer of fascia with the bovie. The fascia was incised in the midline and the incision extended laterally with the rodrigues scissors. Superior of the fascial incision was grasped with Kocher clamps and  from the rectus muscles sharply and bluntly. In a similar fashion, the inferior aspect of the fascial incision was grasped with the Kocher clamps and  from the rectus muscles sharply and bluntly. The rectus muscles were  in the midline bluntly and peritoneum entered bluntly. The peritoneal incision was extended manually. Bladder flap was created by sharp dissectionand plapcement of a bladder blade. The lower uterine incision made with the scalpel. Incision extended manually laterally. Infants head delivered atraumatically. Nose and mouth suctioned. Cord clamped and cut. Infant handed off to the waiting NICU staff. The uterus was exteriorized and cleared of all clots and debris. Hysterotomy was closed with 0-vicryl in a running, locking fashion. A second layer of 0-vicryl was placed in a imbricating fashion. The pelvis and gutters were cleared of all clots and debris, the uterus returned to the abdomen and hemostasis was assured throughout. 3-0 monocryl suture to close a small rent in the peritoneum overlying the bladder.     Fascial incision repaired with 0-vicryl in a running fashion. Subcutaneous tissue was cleared of all clots and debris and hemostasis assured. Subcutaneous tissue reapproximated with 3-0 vicryl rapide in a running fashion. Skin closed with insorb staples. Pt tolerated procedure well. Sponge, lap and needle counts correct x 3. Disposition: Pt to RR stable and infant to  nursery stable.     Pathology: none      Rey Miller DO  5:45 PM  19

## 2019-08-09 NOTE — ROUTINE PROCESS
SBAR IN Report: Mother Verbal report received from Lawrence Tracey (full name & credentials) on this patient, who is now being transferred from L&D (unit) for routine progression of care. The patient is wearing a green \"Anesthesia-Duramorph\" band. Report consisted of patient's Situation, Background, Assessment and Recommendations (SBAR).  ID bands were compared with the identification form, and verified with the patient and transferring nurse. Information from the SBAR, [place] and Procedure Summary and the [place] Report was reviewed with the transferring nurse; opportunity for questions and clarification provided.

## 2019-08-09 NOTE — PROGRESS NOTES
Dr. Cheng English called on phone and updated on patient status and SVE per flowsheet. No new orders received.

## 2019-08-09 NOTE — PROGRESS NOTES
Subjective: Pt tolerating labor well. No further FHR abn. On pit and comfortable w/ KRISTAL.      Objective:    Vitals:    19 0536 19 0551 19 0608 19 0741   BP: 105/59 97/53 99/52    Pulse: 78 82 76    Resp:       Temp:    98.7 °F (37.1 °C)       FHT's:  Baseline , accels/no decels  [place]:  Mod ctxs    SVE:  6/100/-1  Membranes:  srom    Assessement and Plan: Shameka Cutting 26 y.o.  at 39w0d admitted for induction for gest HTN    Continue pitocin augmentation     Pain control: per Radha Patel DO    9:07 AM    19

## 2019-08-09 NOTE — PROGRESS NOTES
Jesús Cordoba at bedside at [facility]. NASRA Davis at bedside at [facility] pt to sitting up on bedside at [facility]. Timeout completed at [address] [facility] with MD, NASRA and myself at bedside. Test dose given at [facility]. Negative reaction. Dose given at 0037  . Pt assisted to lying back in left tilt position. See anesthesia record for details. See vital sign flow sheet for BP. Tolerated procedure well.

## 2019-08-09 NOTE — ANESTHESIA PREPROCEDURE EVALUATION
Relevant Problems   No relevant active problems       Anesthetic History   No history of anesthetic complications            Review of Systems / Medical History  Pertinent labs reviewed    Pulmonary  Within defined limits                 Neuro/Psych   Within defined limits           Cardiovascular    Hypertension (PIH)              Exercise tolerance: >4 METS     GI/Hepatic/Renal  Within defined limits              Endo/Other        Obesity     Other Findings              Physical Exam    Airway  Mallampati: II  TM Distance: 4 - 6 cm  Neck ROM: normal range of motion   Mouth opening: Normal     Cardiovascular  Regular rate and rhythm,  S1 and S2 normal,  no murmur, click, rub, or gallop             Dental  No notable dental hx       Pulmonary  Breath sounds clear to auscultation               Abdominal  GI exam deferred       Other Findings            Anesthetic Plan    ASA: 2  Anesthesia type: epidural            Anesthetic plan and risks discussed with: Patient      H&P filled out at time of . I can't find the one completed by Dr. Mg Vela this morning.

## 2019-08-09 NOTE — PROGRESS NOTES
Shift assessment complete, see flowsheet for complete assessment. POC discussed with patient, verbalizes understanding and agreement. Denies any questions, needs, or concerns. Patient resting in bed. Call light within reach. Encouraged to call prn-verbalizes understanding.

## 2019-08-09 NOTE — PROGRESS NOTES
Dr. Madison Hernandez called and notified of fetal heart rate tracing. Stopped pitocin. Oxygen via mask at 10liters.

## 2019-08-10 LAB
HCT VFR BLD AUTO: 29.2 % (ref 35.8–46.3)
HGB BLD-MCNC: 9.5 G/DL (ref 11.7–15.4)

## 2019-08-10 PROCEDURE — 74011250637 HC RX REV CODE- 250/637: Performed by: ANESTHESIOLOGY

## 2019-08-10 PROCEDURE — 65270000029 HC RM PRIVATE

## 2019-08-10 PROCEDURE — 36415 COLL VENOUS BLD VENIPUNCTURE: CPT

## 2019-08-10 PROCEDURE — 74011250637 HC RX REV CODE- 250/637: Performed by: OBSTETRICS & GYNECOLOGY

## 2019-08-10 PROCEDURE — 85018 HEMOGLOBIN: CPT

## 2019-08-10 PROCEDURE — 74011250636 HC RX REV CODE- 250/636: Performed by: ANESTHESIOLOGY

## 2019-08-10 PROCEDURE — 74011250636 HC RX REV CODE- 250/636: Performed by: OBSTETRICS & GYNECOLOGY

## 2019-08-10 RX ORDER — ONDANSETRON 4 MG/1
4 TABLET, ORALLY DISINTEGRATING ORAL
Status: DISCONTINUED | OUTPATIENT
Start: 2019-08-10 | End: 2019-08-11 | Stop reason: HOSPADM

## 2019-08-10 RX ORDER — ACETAMINOPHEN 500 MG
1000 TABLET ORAL EVERY 6 HOURS
Status: DISCONTINUED | OUTPATIENT
Start: 2019-08-10 | End: 2019-08-11 | Stop reason: HOSPADM

## 2019-08-10 RX ORDER — IBUPROFEN 800 MG/1
800 TABLET ORAL EVERY 6 HOURS
Status: DISCONTINUED | OUTPATIENT
Start: 2019-08-10 | End: 2019-08-10

## 2019-08-10 RX ORDER — ZOLPIDEM TARTRATE 5 MG/1
5 TABLET ORAL
Status: DISCONTINUED | OUTPATIENT
Start: 2019-08-10 | End: 2019-08-11 | Stop reason: HOSPADM

## 2019-08-10 RX ORDER — OXYCODONE HYDROCHLORIDE 5 MG/1
5 TABLET ORAL
Status: DISCONTINUED | OUTPATIENT
Start: 2019-08-10 | End: 2019-08-11 | Stop reason: HOSPADM

## 2019-08-10 RX ORDER — SODIUM CHLORIDE 0.9 % (FLUSH) 0.9 %
5-40 SYRINGE (ML) INJECTION EVERY 8 HOURS
Status: DISCONTINUED | OUTPATIENT
Start: 2019-08-10 | End: 2019-08-11 | Stop reason: HOSPADM

## 2019-08-10 RX ORDER — SODIUM CHLORIDE 0.9 % (FLUSH) 0.9 %
5-40 SYRINGE (ML) INJECTION AS NEEDED
Status: DISCONTINUED | OUTPATIENT
Start: 2019-08-10 | End: 2019-08-11 | Stop reason: HOSPADM

## 2019-08-10 RX ORDER — IBUPROFEN 800 MG/1
800 TABLET ORAL 3 TIMES DAILY
Status: DISCONTINUED | OUTPATIENT
Start: 2019-08-10 | End: 2019-08-11 | Stop reason: HOSPADM

## 2019-08-10 RX ORDER — ADHESIVE BANDAGE
30 BANDAGE TOPICAL DAILY PRN
Status: DISCONTINUED | OUTPATIENT
Start: 2019-08-10 | End: 2019-08-11 | Stop reason: HOSPADM

## 2019-08-10 RX ORDER — PRENATAL VIT 96/IRON FUM/FOLIC 27MG-0.8MG
1 TABLET ORAL DAILY
Status: DISCONTINUED | OUTPATIENT
Start: 2019-08-11 | End: 2019-08-11 | Stop reason: HOSPADM

## 2019-08-10 RX ORDER — SIMETHICONE 80 MG
80 TABLET,CHEWABLE ORAL
Status: DISCONTINUED | OUTPATIENT
Start: 2019-08-10 | End: 2019-08-11 | Stop reason: HOSPADM

## 2019-08-10 RX ORDER — NALOXONE HYDROCHLORIDE 0.4 MG/ML
0.4 INJECTION, SOLUTION INTRAMUSCULAR; INTRAVENOUS; SUBCUTANEOUS AS NEEDED
Status: DISCONTINUED | OUTPATIENT
Start: 2019-08-10 | End: 2019-08-11 | Stop reason: HOSPADM

## 2019-08-10 RX ORDER — OXYCODONE HYDROCHLORIDE 5 MG/1
5 TABLET ORAL
Status: DISCONTINUED | OUTPATIENT
Start: 2019-08-10 | End: 2019-08-10

## 2019-08-10 RX ADMIN — OXYCODONE HYDROCHLORIDE 5 MG: 5 TABLET ORAL at 04:34

## 2019-08-10 RX ADMIN — OXYCODONE HYDROCHLORIDE 5 MG: 5 TABLET ORAL at 23:42

## 2019-08-10 RX ADMIN — ACETAMINOPHEN 1000 MG: 10 INJECTION, SOLUTION INTRAVENOUS at 08:25

## 2019-08-10 RX ADMIN — KETOROLAC TROMETHAMINE 30 MG: 30 INJECTION, SOLUTION INTRAMUSCULAR at 15:15

## 2019-08-10 RX ADMIN — SIMETHICONE CHEW TAB 80 MG 80 MG: 80 TABLET ORAL at 23:38

## 2019-08-10 RX ADMIN — KETOROLAC TROMETHAMINE 30 MG: 30 INJECTION, SOLUTION INTRAMUSCULAR at 09:36

## 2019-08-10 RX ADMIN — ACETAMINOPHEN 1000 MG: 500 TABLET, FILM COATED ORAL at 20:31

## 2019-08-10 RX ADMIN — ACETAMINOPHEN 1000 MG: 10 INJECTION, SOLUTION INTRAVENOUS at 14:00

## 2019-08-10 RX ADMIN — ACETAMINOPHEN 1000 MG: 10 INJECTION, SOLUTION INTRAVENOUS at 01:47

## 2019-08-10 RX ADMIN — KETOROLAC TROMETHAMINE 30 MG: 30 INJECTION, SOLUTION INTRAMUSCULAR at 00:27

## 2019-08-10 RX ADMIN — IBUPROFEN 800 MG: 800 TABLET, FILM COATED ORAL at 23:38

## 2019-08-10 NOTE — PROGRESS NOTES
Intake and output sheet given to pt. Educated pt on importance of recording I and o with hx of pre-e . Pt verbalizes understanding.

## 2019-08-10 NOTE — PROGRESS NOTES
Admission assessment complete as noted. Patient oriented to room and unit. Plan of care reviewed and patient verbalizes understanding. Questions encouraged and answered. Patent encouraged to call for needs or concerns. Safety Teaching reviewed:   1. Hand hygiene prior to handling the infant. 2. Bracelets with matching numbers are placed on mother and infant  3. An infant security tag  [facility]) is placed on the infant's ankle and monitored  4. All OB nurses wear pink Employee badges - do not give your baby to anyone without proper identification. 5. Never leave the baby alone in the room. 6. The infant should be placed on their back to sleep. on a firm mattress. No toys should be placed in the crib. (safe sleep video offered to view)  7. Never shake the baby (video offered to view)  8. Infant fall prevention - do not sleep with the baby, and place the baby in the crib while ambulating. 5. Mother and Baby Care booklet given to Mother.

## 2019-08-10 NOTE — ANESTHESIA POSTPROCEDURE EVALUATION
Procedure(s):   SECTION. No value filed. Anesthesia Post Evaluation        Patient location during evaluation: floor  Patient participation: complete - patient participated  Level of consciousness: awake  Pain management: satisfactory to patient  Airway patency: patent  Anesthetic complications: no  Cardiovascular status: hemodynamically stable  Respiratory status: spontaneous ventilation  Hydration status: euvolemic  Post anesthesia nausea and vomiting:  none      No vitals data found for the desired time range.

## 2019-08-10 NOTE — PROGRESS NOTES
Toradol 30 mg given slow IV push for complaints of incisional discomfort. Walking in [place] today     08/10/19 1513   Pain Assessment   Pain Scale 1 Numeric (0 - 10)   Pain Intensity 1 4   Pain Location 1 Incisional   Pain Orientation 1 Lower   Pain Description 1 Aching; Sore   Pain Intervention(s) 1 Medication (see MAR)

## 2019-08-10 NOTE — PROGRESS NOTES
Toradol given slow IV push for complaints of incisional pain. 08/10/19 0936   Pain Assessment   Pain Scale 1 Numeric (0 - 10)   Pain Intensity 1 3   Pain Location 1 Abdomen; Incisional   Pain Orientation 1 Lower   Pain Description 1 Sore   Pain Intervention(s) 1 Medication (see MAR)

## 2019-08-10 NOTE — PROGRESS NOTES
Care Management Interventions  PCP Verified by CM: Yes(Provided list of PCP's)  Discharge Location  Discharge Placement: Home  Provided Postpartum Depression packet and [facility] Home Visit brochure. brochure. Pt is interested in visit - referral will be made.

## 2019-08-10 NOTE — PROGRESS NOTES
Called dr label with [facility] to clarify ofirmev orders. Dr order to give one time dose now and to give q6 hr instead of PO tylenol. Read back and verified.

## 2019-08-10 NOTE — PROGRESS NOTES
Called dr Valeria Augustin with anesthesia to clarify on orders for ofirmev to be given now per maynor order instead of PO tylenol per anesthesia. Dr Valeria Augustin okay with Danie Paige if dr Deanna Duggan. Read back and verified. Will call on call dr damien mcguire.

## 2019-08-10 NOTE — PROGRESS NOTES
Patient up to bathroom with This RN and PCT assistance. Cherie-care taught and completed. Questions encouraged and answered. Patient ambulating without difficulty, encouraged to call for needs or concerns. Verbalizes understanding.

## 2019-08-10 NOTE — PROGRESS NOTES
Anesthesiology  Post-op Note    Post-op day 1 s/p  via epidural with neuraxial opioids for post-op pain management. Visit Vitals  /78 (BP 1 Location: Right arm, BP Patient Position: At rest)   Pulse 85   Temp 36.6 °C (97.9 °F)   Resp 18   LMP 2018 (Approximate)   SpO2 97%   Breastfeeding? Unknown     Airway patent, patient appropriately hydrated and appears euvolemic. Patient is Alert and oriented. Pain is well controlled. Pruritus is absent. Nausea is absent. No complaints about back or site of injection. Motor and sensory function has returned to baseline in lower extremities. Patient is satisfied with anesthetic and reports no complications. Continue current orders, then initiate surgeon's orders for pain management 24 hours after . Follow up per surgeon.

## 2019-08-11 VITALS
HEART RATE: 75 BPM | TEMPERATURE: 98.1 F | DIASTOLIC BLOOD PRESSURE: 80 MMHG | RESPIRATION RATE: 16 BRPM | OXYGEN SATURATION: 96 % | SYSTOLIC BLOOD PRESSURE: 124 MMHG

## 2019-08-11 PROCEDURE — 90715 TDAP VACCINE 7 YRS/> IM: CPT | Performed by: OBSTETRICS & GYNECOLOGY

## 2019-08-11 PROCEDURE — 74011250637 HC RX REV CODE- 250/637: Performed by: OBSTETRICS & GYNECOLOGY

## 2019-08-11 PROCEDURE — 3E0234Z INTRODUCTION OF SERUM, TOXOID AND VACCINE INTO MUSCLE, PERCUTANEOUS APPROACH: ICD-10-PCS | Performed by: OBSTETRICS & GYNECOLOGY

## 2019-08-11 PROCEDURE — 74011250636 HC RX REV CODE- 250/636: Performed by: OBSTETRICS & GYNECOLOGY

## 2019-08-11 RX ORDER — OXYCODONE HYDROCHLORIDE 5 MG/1
5 TABLET ORAL
Qty: 30 TAB | Refills: 0 | Status: SHIPPED | OUTPATIENT
Start: 2019-08-11 | End: 2019-08-14

## 2019-08-11 RX ORDER — IBUPROFEN 800 MG/1
800 TABLET ORAL 3 TIMES DAILY
Qty: 60 TAB | Refills: 1 | Status: SHIPPED | OUTPATIENT
Start: 2019-08-11 | End: 2019-09-20

## 2019-08-11 RX ADMIN — ACETAMINOPHEN 1000 MG: 500 TABLET, FILM COATED ORAL at 03:55

## 2019-08-11 RX ADMIN — PRENATAL VIT W/ FE FUMARATE-FA TAB 27-0.8 MG 1 TABLET: 27-0.8 TAB at 08:21

## 2019-08-11 RX ADMIN — OXYCODONE HYDROCHLORIDE 5 MG: 5 TABLET ORAL at 09:15

## 2019-08-11 RX ADMIN — TETANUS TOXOID, REDUCED DIPHTHERIA TOXOID AND ACELLULAR PERTUSSIS VACCINE, ADSORBED 0.5 ML: 5; 2.5; 8; 8; 2.5 SUSPENSION INTRAMUSCULAR at 14:21

## 2019-08-11 RX ADMIN — OXYCODONE HYDROCHLORIDE 5 MG: 5 TABLET ORAL at 05:47

## 2019-08-11 RX ADMIN — ACETAMINOPHEN 1000 MG: 500 TABLET, FILM COATED ORAL at 10:35

## 2019-08-11 RX ADMIN — IBUPROFEN 800 MG: 800 TABLET, FILM COATED ORAL at 08:21

## 2019-08-11 RX ADMIN — SIMETHICONE CHEW TAB 80 MG 80 MG: 80 TABLET ORAL at 10:36

## 2019-08-11 NOTE — LACTATION NOTE

## 2019-08-11 NOTE — LACTATION NOTE
Early discharge. Mom and baby are going home today. Continue to offer the breast without restriction. Mom's milk should be fully in over the next few days. Reviewed engorgement precautions. Hand Expression has been demoed and written hand-out reviewed. As milk comes in baby will be more alert at the breast and swallows will be more obvious. Breasts may feel softer once baby has finished nursing. Baby should be back to birth weight by 3weeks of age. And then gain on average 1 oz per day for the next 2-3 months. Reviewed babies should be exclusively breastfeeding for the first 6 months and that breastfeeding should continue after introduction of appropriate complimentary foods after 6 months. Initial output should be at least 1 wet and 1 bowel movement for each day old baby is. By day 5-7 once milk is fully in baby will consistently have 6 or more soaking wet diapers and about 4 bowel movement. Some babies have a bowel movement with every feeding and some have 1-3 large bowel movements each day. Inadequate output may indicate inadequate feedings and should be reported to your Pediatrician. Bowel habits may change as baby gets older. Encouraged follow-up at Pediatrician in 1-2 days, no later than 1 week of age. Call [facility] for any questions as needed or to set up an OP visit. OP phone calls are returned within 24 hours. Community Breastfeeding Resource List given. Reviewed set up of personal use pump per mom request. Full instruction given on parts, pump motor, setup and pump settings. Discussed safe breastmilk storage guidelines. Questions answered.

## 2019-08-11 NOTE — PROGRESS NOTES
Motrin 800 mg given po for complaints of pain in incision. 08/11/19 0821   Pain Assessment   Pain Scale 1 Numeric (0 - 10)   Pain Intensity 1 4   Pain Location 1 Incisional   Pain Orientation 1 Lower   Pain Description 1 Cramping;Aching; Sore   Pain Intervention(s) 1 Medication (see MAR)

## 2019-08-11 NOTE — DISCHARGE INSTRUCTIONS
Patient Education         Section: What to Expect at Home  Your Recovery    A  section, or , is surgery to deliver your baby through a cut, called an incision, that the doctor makes in your lower belly and uterus. You may have some pain in your lower belly and need pain medicine for 1 to 2 weeks. You can expect some vaginal bleeding for several weeks. You will probably need about 6 weeks to fully recover. It is important to take it easy while the incision is healing. Avoid heavy lifting, strenuous activities, or exercises that strain the belly muscles while you are recovering. Ask a family member or friend for help with housework, cooking, and shopping. This care sheet gives you a general idea about how long it will take for you to recover. But each person recovers at a different pace. Follow the steps below to get better as quickly as possible. How can you care for yourself at home? Activity    · Rest when you feel tired. Getting enough sleep will help you recover.     · Try to walk each day. Start by walking a little more than you did the day before. Bit by bit, increase the amount you walk. Walking boosts blood flow and helps prevent pneumonia, constipation, and blood clots.     · Avoid strenuous activities, such as bicycle riding, jogging, weightlifting, and aerobic exercise, for 6 weeks or until your doctor says it is okay.     · Until your doctor says it is okay, do not lift anything heavier than your baby.     · Do not do sit-ups or other exercises that strain the belly muscles for 6 weeks or until your doctor says it is okay.     · Hold a pillow over your incision when you cough or take deep breaths. This will support your belly and decrease your pain.     · You may shower as usual. Pat the incision dry when you are done.     · You will have some vaginal bleeding. Wear sanitary pads.  Do not douche or use tampons until your doctor says it is okay.     · Ask your doctor when you can drive again.     · You will probably need to take at least 6 weeks off work. It depends on the type of work you do and how you feel.     · Ask your doctor when it is okay for you to have sex. Diet    · You can eat your normal diet. If your stomach is upset, try bland, low-fat foods like plain rice, broiled chicken, toast, and yogurt.     · Drink plenty of fluids (unless your doctor tells you not to).     · You may notice that your bowel movements are not regular right after your surgery. This is common. Try to avoid constipation and straining with bowel movements. You may want to take a fiber supplement every day. If you have not had a bowel movement after a couple of days, ask your doctor about taking a mild laxative.     · If you are breastfeeding, limit alcohol. Alcohol can cause a lack of energy and other health problems for the baby when a breastfeeding woman drinks heavily. It can also get in the way of a mom's ability to feed her baby or to care for the child in other ways. There isn't a lot of research about exactly how much alcohol can harm a baby. Having no alcohol is the safest choice for your baby. If you choose to have a drink now and then, have only one drink, and limit the number of occasions that you have a drink. Wait to breastfeed at least 2 hours after you have a drink to reduce the amount of alcohol the baby may get in the milk. Medicines    · Your doctor will tell you if and when you can restart your medicines. He or she will also give you instructions about taking any new medicines.     · If you take blood thinners, such as warfarin (Coumadin), clopidogrel (Plavix), or aspirin, be sure to talk to your doctor. He or she will tell you if and when to start taking those medicines again. Make sure that you understand exactly what your doctor wants you to do.     · Take pain medicines exactly as directed. ? If the doctor gave you a prescription medicine for pain, take it as prescribed. ?  If you are not taking a prescription pain medicine, ask your doctor if you can take an over-the-counter medicine.     · If you think your pain medicine is making you sick to your stomach:  ? Take your medicine after meals (unless your doctor has told you not to). ? Ask your doctor for a different pain medicine.     · If your doctor prescribed antibiotics, take them as directed. Do not stop taking them just because you feel better. You need to take the full course of antibiotics. Incision care    · If you have strips of tape on the incision, leave the tape on for a week or until it falls off.     · Wash the area daily with warm, soapy water, and pat it dry. Don't use hydrogen peroxide or alcohol, which can slow healing. You may cover the area with a gauze bandage if it weeps or rubs against clothing. Change the bandage every day.     · Keep the area clean and dry. Other instructions    · If you breastfeed your baby, you may be more comfortable while you are healing if you place the baby so that he or she is not resting on your belly. Try tucking your baby under your arm, with his or her body along the side you will be feeding on. Support your baby's upper body with your arm. With that hand you can control your baby's head to bring his or her mouth to your breast. This is sometimes called the football hold. Follow-up care is a key part of your treatment and safety. Be sure to make and go to all appointments, and call your doctor if you are having problems. It's also a good idea to know your test results and keep a list of the medicines you take. When should you call for help? [id number] anytime you think you may need emergency care.  For example, call if:    · You have thoughts of harming yourself, your baby, or another person.     · You passed out (lost consciousness).     · You have chest pain, are short of breath, or cough up blood.     · You have a seizure.    Call your doctor now or seek immediate medical care if:    · You have pain that does not get better after you take pain medicine.     · You have severe vaginal bleeding.     · You are dizzy or lightheaded, or you feel like you may faint.     · You have new or worse pain in your belly or pelvis.     · You have loose stitches, or your incision comes open.     · You have symptoms of infection, such as:  ? Increased pain, swelling, warmth, or redness. ? Red streaks leading from the incision. ? Pus draining from the incision. ? A fever.     · You have symptoms of a blood clot in your leg (called a deep vein thrombosis), such as:  ? Pain in your calf, back of the knee, thigh, or groin. ? Redness and swelling in your leg or groin.     · You have signs of preeclampsia, such as:  ? Sudden swelling of your face, hands, or feet. ? New vision problems (such as dimness, blurring, or seeing spots). ? A severe headache.    Watch closely for changes in your health, and be sure to contact your doctor if:    · You do not get better as expected. Where can you learn more? Go to http://dennis-alison.info/. Enter M806 in the search box to learn more about \" Section: What to Expect at Home. \"  Current as of: 2018  Content Version: 12.1  © 9848-2096 [employer]. Care instructions adapted under license by [employer] (which disclaims liability or warranty for this information). If you have questions about a medical condition or this instruction, always ask your healthcare professional. [place] [address] any warranty or liability for your use of this information.

## 2019-08-11 NOTE — DISCHARGE SUMMARY
Obstetrical Discharge Summary     Name: Edilia Holloway MRN: [id number]  SSN: [id number]    YOB: 1995  Age: 26 y.o. Sex: female      Allergies: Amoxicillin    Admit Date: 2019    Discharge Date: 2019     Admitting Physician: Garfield Fowler DO     Attending Physician:  Juan Cardoza DO     * Admission Diagnoses: 38 weeks gestation of pregnancy [Z3A.38]  Preeclampsia, third trimester [O14.93]  Encounter for planned induction of labor [Z34.90]    * Discharge Diagnoses:   Information for the patient's :  Eduarda Souza [[id number]]   Delivery of a 3.685 kg female infant via , Low Transverse on 2019 at 4:57 PM  by Garfield Fowler. Apgars were 8  and 9 . Additional Diagnoses:   Hospital Problems as of 2019 Date Reviewed: 2019          Codes Class Noted - Resolved POA    38 weeks gestation of pregnancy ICD-10-CM: Z3A.38  ICD-9-CM: V22.2  2019 - Present Unknown        Preeclampsia, third trimester ICD-10-CM: O14.93  ICD-9-CM: 642.43  2019 - Present Yes        Encounter for planned induction of labor ICD-10-CM: Z34.90  ICD-9-CM: V22.1  2019 - Present Yes        Fetal macrosomia during pregnancy in third trimester ICD-10-CM: O36.63X0  ICD-9-CM: 656.63  2019 - Present Yes    Overview Signed 2019  7:02 PM by Alexis SANTAMARIA DO     Overall growth pattern measuring 10 days ahead with BPD and AC in the 95th and 93rd percentiles respectively estimated fetal weight 3348 g consistent with an overall growth pattern in the 89th percentile                  Lab Results   Component Value Date/Time    ABO/Rh(D) O POSITIVE 2019 06:40 PM    Rubella, External Immune 2019    Rubella, External 1.31  Immune 2019    ABO,Rh O+ Positive 2019    There is no immunization history for the selected administration types on file for this patient.     * Procedures:   Procedure(s):   SECTION           * Discharge Condition: good    * Hospital Course: Normal hospital course following the delivery. * Disposition: Home    Discharge Medications:   Current Discharge Medication List      START taking these medications    Details   ibuprofen (MOTRIN) 800 mg tablet Take 1 Tab by mouth three (3) times daily. Qty: 60 Tab, Refills: 1      oxyCODONE IR (ROXICODONE) 5 mg immediate release tablet Take 1 Tab by mouth every three (3) hours as needed for Pain for up to 3 days. Max Daily Amount: 40 mg.  Qty: 30 Tab, Refills: 0    Associated Diagnoses: Encounter for planned induction of labor         CONTINUE these medications which have NOT CHANGED    Details   [id number] orlando-folic-dha (PRENATAL DHA+COMPLETE PRENATAL) -300 mg-mcg-mg cmpk Take  by mouth. * Follow-up Care/Patient Instructions:   Activity: Activity as tolerated and No sex for 6 weeks  Diet: Regular Diet  Wound Care: Keep wound clean and dry    Follow-up Information     Follow up With Specialties Details Why Contact Info    None    None ([phone]) Patient stated that they have no PCP

## 2019-08-11 NOTE — PROGRESS NOTES
Oxycodone 5 mg given po for complaints of incisonal pain of 6/10.     08/11/19 0915   Pain Assessment   Pain Scale 1 Numeric (0 - 10)   Pain Intensity 1 6   Pain Location 1 Abdomen; Incisional   Pain Orientation 1 Lower   Pain Description 1 Cramping; Sore

## 2019-08-11 NOTE — PROGRESS NOTES
Postop D#2 LTCS  AFVSS  Abd-soft NT ND  Inc-C/D/I  Plan: Rtn postop care, pt wishes to have dc today

## 2019-11-05 ENCOUNTER — HOSPITAL ENCOUNTER (OUTPATIENT)
Dept: LACTATION | Age: 24
Discharge: HOME | End: 2019-11-05
Attending: OBSTETRICS & GYNECOLOGY
Payer: COMMERCIAL

## 2019-11-05 PROCEDURE — 99403 PREV MED CNSL INDIV APPRX 45: CPT

## 2019-11-05 NOTE — LACTATION NOTE
2019  Re: José Antonio Narayanan  [birth year])    Dear Dr. Elsa Chang,     I saw Chase Speaks and her mother Mich Corral in our Lactation office today. Mom came in today after seeing her OBGYN Dr. Maggie Norris for a consult about low milk supply. Mom started estrogen patch for birth control 2 months ago. Was pumping 2 oz before patch. Mom states her OB chart listed her as bottle feeding. Baby has been refusing to latch for a week and when mom pumps she gets drops to 10 ml total.      Date Weight Comments   19 8-2 Birthweight   19 7-11 Discharge Weight   10-14-19 12-0.5 At [facility] Dry Diaper   19 12-7 Naked At [facility] OP Lactation       Marycruz Fong was alert and ready to eat. Attempted at breast, but baby was extremely fussy. She refused to latch. Tried with formula at breast, but was too fussy. Discussed mom is basically re-lactating at this point. Tried offering 2 oz bottle and then back to breast, but she still refused. Discussed making the breast a place she enjoys being again. Based on last recorded weight per mom, weight gain appears to be slow. Suggested mom offer 6 oz in bottle if only feeding 5 times per day or offer a 6th feeding at 5 oz. Estimated Needs:  Baby needs 28-32 oz of breast milk/formula per day based on 5 feedings per day. Baby needs 6 oz of breast milk/formula per feeding. The more often baby eats the less volume they need per feeding. Plan:   Work on supply recovery. Noted Dr. Maggie Norris prescribed Reglan. Discussed caution with using Reglan. If using report any symptoms or signs of depression to caregiver immediately. Unsure how much supply recovery will be. Suggest pumping consistently with at least every feeding and up to 8 times in 24 hours to stimulate supply. Can pump before bed for extra stimulation. Suggest skin to skin or bath with baby for enjoyable time at breast that is non-feeding.       Follow-up: To Ped 19. Will call 1 week 19.   Call as needed before.     Sincerely,      Tia Mcbride Simon [address], [address] [place] [address], [address] [facility]

## 2019-11-05 NOTE — LACTATION NOTE
Outpatient Feeding Plan for Breastfeeding  [phone]  Patient: Franklin Ruiz  Gestational Age: 36w 6d   Diagnosis:  V 24.1/Z39.1 Low milk supply. Maryam Sin  OB/GYN Dr. María Gabriel Time:  1120  Stop Time:  1210    Good, active breastfeeding is when baby is alert, tugging the nipple in long, deep pulls, and you can hear swallows every 1-2 sucks. By now Mom's milk should be in. Brief, light or shallow sucks or short rapid sucks without frequent swallows should not be considered a full breastfeeding. 2. Put the baby to the breast if she is calm, continue to try. If she is very upset, do not push it. Finish first side before offering other side. 3. After breastfeeding, supplement your baby by bottle with 6 oz of breast milk/formula: 30ml = 1oz. Position the baby upright to bottle feed. Ensure the nipple is all the way in the baby's mouth and lips are flanged around the bottle. 4. Pump for 15 minutes after nursing. Try to pump within 15 minutes of breastfeeding. Be consistent. 5. If pumping and bottle feeding, give baby 6 oz of breast milk/formula and double pump with massage and compression for 15 minutes. Estimated Needs:  Baby needs 28-32 oz of breast milk/formula per day based on 5 feedings per day. Baby needs 6 oz of breast milk/formula per feeding. The more often baby eats the less volume they need per feeding. Date Weight Comments   8-9-19 8-2 Birthweight   8-11-19 7-11 Discharge Weight   10-14-19 12-0.5    11-5-19 12-7 Naked At [address] [facility] OP Lactation      Average weight gain for the first 3 months is 1oz per day. Minimum weight gain in the first 3 months is 0.5 oz per day. Typically regaining to birth weight by 2 weeks. Date Side Position Time Before Wt. After Wt Total   11-5-19    5800                Handouts given: Herbs  Herbal Supplement for increasing milk supply handout given.   Reviewed herbs will not work without stimulation. Mom reports no allergy to peanuts and no trouble with blood sugar or thyroid. Encouraged to discuss herbal supplements with Doctor. If taking herbs, they should be taken with food. Mom has tried a few supplements without much result. Discussed supplements do not always work, but could have also been stopped by estrogen. Baby last ate at 18. Took 5 oz by bottle. Mom pumped ~5 ml on L and drops on R. Mom started estrogen patch for birth control 2 months ago. Was pumping 2 oz before patch. Mom states her OB chart listed her as bottle feeding. Baby has been refusing to latch for a week and when mom pumps she gets drops to 10 ml total.  Mom will need to re-lactate. Shoaib  was alert and ready to eat. Attempted at breast, but baby was extremely fussy. She refused to latch. Tried with formula at breast, but was too fussy. Discussed mom is basically re-lactating at this point. Tried offering 2 oz bottle and then back to breast, but she still refused. Discussed making the breast a place she enjoys being again. Based on last recorded weight per mom, weight gain appears to be slow. Suggested mom offer 6 oz in bottle if only feeding 5 times per day or offer a 6th feeding at 5 oz. Baby's    Oral Digital Assessment:  Not done. Output:  Soaking wets. Yellow, runny, seedy, every other day stools. Mom's    Nipples:  Small, everted   Breasts:  Medium. Plan:  Suggest offering 6 oz per feeding if feeding 5 times per day. Work on supply recovery. Noted Dr. Mesa Bachelor prescribed Reglan. Discussed caution with using Reglan. If using report any symptoms or signs of depression to caregiver immediately. Unsure how much supply recovery will be. Suggest pumping consistently with at least every feeding and up to 8 times in 24 hours to stimulate supply. Can pump before bed for extra stimulation. Suggest skin to skin or bath with baby for enjoyable time at breast that is non-feeding. Follow-up: To Ped 12-9-19. Will call 1 week 11-12-19. Call as needed before.

## 2020-10-21 PROBLEM — O14.93 PREECLAMPSIA, THIRD TRIMESTER: Status: RESOLVED | Noted: 2019-08-07 | Resolved: 2020-10-21

## 2020-10-21 PROBLEM — Z34.03 PRIMIGRAVIDA IN THIRD TRIMESTER: Status: RESOLVED | Noted: 2019-01-31 | Resolved: 2020-10-21

## 2020-10-21 PROBLEM — Z98.891 HISTORY OF C-SECTION: Status: ACTIVE | Noted: 2020-10-21

## 2020-10-21 PROBLEM — O26.843 UTERINE SIZE DATE DISCREPANCY PREGNANCY, THIRD TRIMESTER: Status: RESOLVED | Noted: 2019-07-18 | Resolved: 2020-10-21

## 2020-10-21 PROBLEM — Z34.90 ENCOUNTER FOR PLANNED INDUCTION OF LABOR: Status: RESOLVED | Noted: 2019-08-07 | Resolved: 2020-10-21

## 2020-10-21 PROBLEM — O36.8130 DECREASED FETAL MOVEMENT AFFECTING MANAGEMENT OF PREGNANCY IN THIRD TRIMESTER: Status: RESOLVED | Noted: 2019-04-29 | Resolved: 2020-10-21

## 2020-10-21 PROBLEM — R79.89 ABNORMAL SERUM THYROID STIMULATING HORMONE (TSH) LEVEL: Status: RESOLVED | Noted: 2019-06-20 | Resolved: 2020-10-21

## 2020-10-21 PROBLEM — Z3A.38 38 WEEKS GESTATION OF PREGNANCY: Status: RESOLVED | Noted: 2019-08-07 | Resolved: 2020-10-21

## 2022-03-19 PROBLEM — O14.93 PRE-ECLAMPSIA IN THIRD TRIMESTER: Status: ACTIVE | Noted: 2019-08-07

## 2022-03-19 PROBLEM — Z98.891 HISTORY OF C-SECTION: Status: ACTIVE | Noted: 2020-10-21

## 2022-03-19 PROBLEM — O36.63X0 FETAL MACROSOMIA DURING PREGNANCY IN THIRD TRIMESTER: Status: ACTIVE | Noted: 2019-07-28

## 2022-08-02 ENCOUNTER — OFFICE VISIT (OUTPATIENT)
Dept: OBGYN CLINIC | Age: 27
End: 2022-08-02
Payer: COMMERCIAL

## 2022-08-02 VITALS
WEIGHT: 176 LBS | SYSTOLIC BLOOD PRESSURE: 118 MMHG | HEIGHT: 62 IN | DIASTOLIC BLOOD PRESSURE: 74 MMHG | BODY MASS INDEX: 32.39 KG/M2

## 2022-08-02 DIAGNOSIS — N94.6 DYSMENORRHEA: ICD-10-CM

## 2022-08-02 DIAGNOSIS — Z12.4 SCREENING FOR CERVICAL CANCER: ICD-10-CM

## 2022-08-02 DIAGNOSIS — Z11.3 SCREEN FOR STD (SEXUALLY TRANSMITTED DISEASE): ICD-10-CM

## 2022-08-02 DIAGNOSIS — N92.0 MENORRHAGIA WITH REGULAR CYCLE: ICD-10-CM

## 2022-08-02 DIAGNOSIS — Z01.419 WELL WOMAN EXAM WITH ROUTINE GYNECOLOGICAL EXAM: Primary | ICD-10-CM

## 2022-08-02 PROCEDURE — 99395 PREV VISIT EST AGE 18-39: CPT | Performed by: OBSTETRICS & GYNECOLOGY

## 2022-08-02 ASSESSMENT — PATIENT HEALTH QUESTIONNAIRE - PHQ9
SUM OF ALL RESPONSES TO PHQ9 QUESTIONS 1 & 2: 0
2. FEELING DOWN, DEPRESSED OR HOPELESS: 1
SUM OF ALL RESPONSES TO PHQ QUESTIONS 1-9: 0
SUM OF ALL RESPONSES TO PHQ QUESTIONS 1-9: 0
1. LITTLE INTEREST OR PLEASURE IN DOING THINGS: 1
SUM OF ALL RESPONSES TO PHQ QUESTIONS 1-9: 0
SUM OF ALL RESPONSES TO PHQ QUESTIONS 1-9: 2
SUM OF ALL RESPONSES TO PHQ QUESTIONS 1-9: 2
SUM OF ALL RESPONSES TO PHQ QUESTIONS 1-9: 0
2. FEELING DOWN, DEPRESSED OR HOPELESS: 0
SUM OF ALL RESPONSES TO PHQ QUESTIONS 1-9: 2
1. LITTLE INTEREST OR PLEASURE IN DOING THINGS: 0
SUM OF ALL RESPONSES TO PHQ QUESTIONS 1-9: 2
SUM OF ALL RESPONSES TO PHQ9 QUESTIONS 1 & 2: 2

## 2022-08-08 LAB
C TRACH RRNA CVX QL NAA+PROBE: NEGATIVE
CYTOLOGIST CVX/VAG CYTO: NORMAL
CYTOLOGY CVX/VAG DOC THIN PREP: NORMAL
HPV REFLEX: NORMAL
Lab: NORMAL
N GONORRHOEA RRNA CVX QL NAA+PROBE: NEGATIVE
PATH REPORT.FINAL DX SPEC: NORMAL
STAT OF ADQ CVX/VAG CYTO-IMP: NORMAL
T VAGINALIS RRNA SPEC QL NAA+PROBE: NEGATIVE

## 2023-07-27 NOTE — LACTATION NOTE
This note was copied from a baby's chart. Lactation visit. Jing Staley first time parents. Report baby has been latching well. Mom attempting now on left breast but baby fussy and crying. Assisted with supportive hold on left breast but baby frantic. Swaddled and soothed infant. Assisted on left breast in cross cradle. Reviewed supportive hold with head control and compression of breast tissue. Everted nipples. Baby latched well. Reviewed signs of good latch with parents. Baby fed well on left breast x 15 minutes and then switched to right breast, feeding well on right breast at present. Reviewed feeding expectations. Feed on demand. Watch for feeding cues. Wake as needed. Doing well. Questions answered. ascites/respiratory compromise ascites
